# Patient Record
Sex: FEMALE | Race: BLACK OR AFRICAN AMERICAN | RURAL
[De-identification: names, ages, dates, MRNs, and addresses within clinical notes are randomized per-mention and may not be internally consistent; named-entity substitution may affect disease eponyms.]

---

## 2019-02-11 ENCOUNTER — OFFICE VISIT (OUTPATIENT)
Dept: FAMILY MEDICINE CLINIC | Age: 29
End: 2019-02-11

## 2019-02-11 VITALS
TEMPERATURE: 97.5 F | BODY MASS INDEX: 34.16 KG/M2 | DIASTOLIC BLOOD PRESSURE: 73 MMHG | HEIGHT: 63 IN | SYSTOLIC BLOOD PRESSURE: 131 MMHG | HEART RATE: 94 BPM | WEIGHT: 192.8 LBS | RESPIRATION RATE: 20 BRPM | OXYGEN SATURATION: 99 %

## 2019-02-11 DIAGNOSIS — R14.0 ABDOMINAL BLOATING: ICD-10-CM

## 2019-02-11 DIAGNOSIS — N92.6 IRREGULAR MENSES: Primary | ICD-10-CM

## 2019-02-11 DIAGNOSIS — Z11.3 ROUTINE SCREENING FOR STI (SEXUALLY TRANSMITTED INFECTION): ICD-10-CM

## 2019-02-11 DIAGNOSIS — R10.13 EPIGASTRIC PAIN: ICD-10-CM

## 2019-02-11 DIAGNOSIS — Z30.09 GENERAL COUNSELING AND ADVICE ON FEMALE CONTRACEPTION: ICD-10-CM

## 2019-02-11 LAB
BILIRUB UR QL STRIP: NEGATIVE
GLUCOSE UR-MCNC: NEGATIVE MG/DL
HCG URINE, QL. (POC): NEGATIVE
KETONES P FAST UR STRIP-MCNC: NEGATIVE MG/DL
PH UR STRIP: 5.5 [PH] (ref 4.6–8)
PROT UR QL STRIP: NORMAL
SP GR UR STRIP: 1.03 (ref 1–1.03)
UA UROBILINOGEN AMB POC: NORMAL (ref 0.2–1)
URINALYSIS CLARITY POC: CLEAR
URINALYSIS COLOR POC: YELLOW
URINE BLOOD POC: NEGATIVE
URINE LEUKOCYTES POC: NORMAL
URINE NITRITES POC: NEGATIVE
VALID INTERNAL CONTROL?: YES

## 2019-02-11 NOTE — PROGRESS NOTES
Chief Complaint   Patient presents with    Swelling     abdomen    Irregular Menses       Visit Vitals  /73 (BP 1 Location: Right arm, BP Patient Position: Sitting)   Pulse 94   Temp 97.5 °F (36.4 °C) (Oral)   Resp 20   Ht 5' 3\" (1.6 m)   Wt 192 lb 12.8 oz (87.5 kg)   LMP 01/14/2019 (Approximate)   SpO2 99%   BMI 34.15 kg/m²     1. Have you been to the ER, urgent care clinic since your last visit? Hospitalized since your last visit? No    2. Have you seen or consulted any other health care providers outside of the [address] [place] [address] since your last visit? Include any pap smears or colon screening.  No    Reviewed record in preparation for visit and have necessary documentation  Pt did not bring medication to office visit for review  opportunity was given for questions  Goals that were addressed and/or need to be completed during or after this appointment include   Health Maintenance Due   Topic Date Due    Pneumococcal 19-64 Medium Risk (1 of 1 - PPSV23) 01/19/2011    DTaP/Tdap/Td series (1 - Tdap) 01/19/2013    PAP AKA CERVICAL CYTOLOGY  01/19/2013    Influenza Age 9 to Adult  08/01/2018

## 2019-02-11 NOTE — PATIENT INSTRUCTIONS

## 2019-02-11 NOTE — PROGRESS NOTES
Paola Cheema  34 y.o. female  1990  [address]  [id number]     [facility]: Progress Note       Encounter Date: 2/11/2019    Chief Complaint   Patient presents with    Swelling     abdomen    Irregular Menses     History of Present Illness   Paola Cheema is a 34 y.o. female who presents to clinic today for:    Irregular Menses- Patient has a 3yo and 5yo daughters and is interested in contraception-IUD. She had depo injections in the past and reports no complaints. She states she had healthy pregnancies and both were vaginal deliveries. Last menstrual cycle \"mid January\" and lasted ~3-4 days with normal bleeding. Denies passing clots. Reports some dizziness and occasional weakness associated with her menstrual cycle. Not employed. Denies h/a and LE edema. Last month she noted abdominal/epi gastric pain not related to her menstrual cycle; pain went away with no treatment. Currently she is in no pain. States she is extremely bloated; ongoing for a couple of months and is worrisome to her. Denies constipation (last BM \"a day ago), rash, fevers, changes in diet or immunizations. Denies exercising or injury. Denies-excessive burping, flatulence, heartburn. States her last pap smear-2017-normal.    Request STI screening; denies exposure. Unprotected sex with a male partner ~ 2 weeks ago. Health Maintenance    Health Maintenance Due   Topic Date Due    Pneumococcal 19-64 Medium Risk (1 of 1 - PPSV23) 01/19/2011    DTaP/Tdap/Td series (1 - Tdap) 01/19/2013    PAP AKA CERVICAL CYTOLOGY  01/19/2013    Influenza Age 9 to Adult  08/01/2018     Review of Systems   Review of Systems   Constitutional: Negative. HENT: Negative. Eyes: Negative. Respiratory: Negative. Cardiovascular: Negative. Gastrointestinal: Positive for abdominal pain. Genitourinary: Negative. Musculoskeletal: Negative. Skin: Negative. Neurological: Negative.     Endo/Heme/Allergies: Negative. Psychiatric/Behavioral: Negative. Vitals/Objective:     Vitals:    02/11/19 0814   BP: 131/73   Pulse: 94   Resp: 20   Temp: 97.5 °F (36.4 °C)   TempSrc: Oral   SpO2: 99%   Weight: 192 lb 12.8 oz (87.5 kg)   Height: 5' 3\" (1.6 m)     Body mass index is 34.15 kg/m². Physical Exam   Constitutional: She is oriented to person, place, and time. She is cooperative. Neck: Trachea normal, normal range of motion, full passive range of motion without pain and phonation normal. Neck supple. No thyroid mass and no thyromegaly present. Cardiovascular: Normal rate, regular rhythm, normal heart sounds and normal pulses. Pulmonary/Chest: Effort normal and breath sounds normal.   Abdominal: Soft. Normal appearance and bowel sounds are normal. She exhibits no distension, no ascites and no mass. There is no hepatosplenomegaly. There is tenderness in the right upper quadrant and epigastric area. There is no rigidity, no rebound, no guarding, no CVA tenderness, no tenderness at McBurney's point and negative Mike's sign. Musculoskeletal: Normal range of motion. Lymphadenopathy:     She has no cervical adenopathy. Neurological: She is alert and oriented to person, place, and time. Skin: Skin is warm, dry and intact. Psychiatric: She has a normal mood and affect.  Her speech is normal and behavior is normal. Judgment and thought content normal. Cognition and memory are normal.       Recent Results (from the past 24 hour(s))   AMB POC URINE PREGNANCY TEST, VISUAL COLOR COMPARISON    Collection Time: 02/11/19  8:28 AM   Result Value Ref Range    VALID INTERNAL CONTROL POC Yes     HCG urine, Ql. (POC) Negative Negative   AMB POC URINALYSIS DIP STICK AUTO W/O MICRO    Collection Time: 02/11/19  8:48 AM   Result Value Ref Range    Color (UA POC) Yellow     Clarity (UA POC) Clear     Glucose (UA POC) Negative Negative    Bilirubin (UA POC) Negative Negative    Ketones (UA POC) Negative Negative    Specific gravity (UA POC) 1.030 1.001 - 1.035    Blood (UA POC) Negative Negative    pH (UA POC) 5.5 4.6 - 8.0    Protein (UA POC) Trace Negative    Urobilinogen (UA POC) 0.2 mg/dL 0.2 - 1    Nitrites (UA POC) Negative Negative    Leukocyte esterase (UA POC) 1+ Negative     Assessment and Plan:   1. Irregular menses    - AMB POC URINE PREGNANCY TEST, VISUAL COLOR COMPARISON  - AMB POC URINALYSIS DIP STICK AUTO W/O MICRO    2. Abdominal bloating    - [facility]; Future    3. Routine screening for STI (sexually transmitted infection)    - CT/NG/T.VAGINALIS AMPLIFICATION  - HIV 1/2 AG/AB, 4TH GENERATION,W RFLX CONFIRM  - RPR    Will call patient to schedule appointment when IUD arrives. Awaiting ultrasound imaging and results. Per patient request ordered STI testing. I have discussed the diagnosis with the patient and the intended plan as seen in the above orders. she has expressed understanding. The patient has received an after-visit summary and questions were answered concerning future plans. I have discussed medication side effects and warnings with the patient as well. Electronically Signed: Mili Gabriel NP     History/Allergies   Patients past medical, surgical and family histories were reviewed and updated. History reviewed. No pertinent past medical history. History reviewed. No pertinent surgical history.   Family History   Problem Relation Age of Onset    Diabetes Mother     High Cholesterol Mother      Social History     Socioeconomic History    Marital status: SINGLE     Spouse name: Not on file    Number of children: Not on file    Years of education: Not on file    Highest education level: Not on file   Social Needs    Financial resource strain: Not on file    Food insecurity - worry: Not on file    Food insecurity - inability: Not on file   "[employer] needs - medical: Not on file   "[employer] needs - non-medical: Not on file   Occupational History    Not on file Tobacco Use    Smoking status: Former Smoker     Types: Cigarettes    Smokeless tobacco: Never Used   Substance and Sexual Activity    Alcohol use: Yes    Drug use: No    Sexual activity: Yes   Other Topics Concern    Not on file   Social History Narrative    Not on file         No Known Allergies    Disposition     Follow-up Disposition:  Return if symptoms worsen or fail to improve. No future appointments. Current Medications after this visit     No current outpatient medications on file. No current facility-administered medications for this visit.       Medications Discontinued During This Encounter   Medication Reason    senna (SENOKOT) 8.6 mg tablet Therapy Completed    ciprofloxacin (CIPRO) 500 mg tablet Therapy Completed

## 2019-02-12 LAB
HIV 1+2 AB+HIV1 P24 AG SERPL QL IA: NON REACTIVE
RPR SER QL: NON REACTIVE

## 2019-02-13 ENCOUNTER — TELEPHONE (OUTPATIENT)
Dept: FAMILY MEDICINE CLINIC | Age: 29
End: 2019-02-13

## 2019-02-13 LAB
C TRACH RRNA SPEC QL NAA+PROBE: NEGATIVE
N GONORRHOEA RRNA SPEC QL NAA+PROBE: NEGATIVE
T VAGINALIS RRNA VAG QL NAA+PROBE: NEGATIVE

## 2019-02-13 NOTE — TELEPHONE ENCOUNTER
Patient called per NP:  Please inform patient of negative STI screening. Patient unavailable, unable to leave message due to full voicemail box.

## 2019-02-14 NOTE — TELEPHONE ENCOUNTER
Patient called per NP:  Please inform patient of negative STI screening. Patient unavailable, unable to leave message due to voicemail box full.

## 2019-10-15 ENCOUNTER — TELEPHONE (OUTPATIENT)
Dept: FAMILY MEDICINE CLINIC | Age: 29
End: 2019-10-15

## 2019-10-15 NOTE — LETTER
10/15/2019 1:50 PM 
 
Ms. Marla Garibay [address] 
[address] Dear Ms. Jose Lin: 
 
Jose Bess missed you! Please call our office at [phone] and schedule a follow up appointment for your continued care. Attempted to call. Unsuccessful. Need to schedule an appointment for Vic with Dr Chepe Perkins Appointment needs to be during menstrual cycle Sincerely, ANNAMARIA MATTA & [employer] CHILD[employer] [facility] [place]

## 2019-10-15 NOTE — TELEPHONE ENCOUNTER
Attempted to call. Unsuccessful.    Need to schedule an appointment for Vic with Dr Cody Irvin  Appointment needs to be during menstrual cycle  Letter sent